# Patient Record
Sex: MALE | Race: WHITE | NOT HISPANIC OR LATINO
[De-identification: names, ages, dates, MRNs, and addresses within clinical notes are randomized per-mention and may not be internally consistent; named-entity substitution may affect disease eponyms.]

---

## 2023-01-09 PROBLEM — Z00.00 ENCOUNTER FOR PREVENTIVE HEALTH EXAMINATION: Status: ACTIVE | Noted: 2023-01-09

## 2023-02-04 ENCOUNTER — NON-APPOINTMENT (OUTPATIENT)
Age: 43
End: 2023-02-04

## 2023-02-07 ENCOUNTER — APPOINTMENT (OUTPATIENT)
Dept: CARDIOLOGY | Facility: CLINIC | Age: 43
End: 2023-02-07
Payer: COMMERCIAL

## 2023-02-07 ENCOUNTER — NON-APPOINTMENT (OUTPATIENT)
Age: 43
End: 2023-02-07

## 2023-02-07 VITALS
WEIGHT: 178 LBS | DIASTOLIC BLOOD PRESSURE: 78 MMHG | OXYGEN SATURATION: 98 % | HEART RATE: 78 BPM | SYSTOLIC BLOOD PRESSURE: 130 MMHG | HEIGHT: 69 IN | BODY MASS INDEX: 26.36 KG/M2

## 2023-02-07 VITALS — DIASTOLIC BLOOD PRESSURE: 80 MMHG | SYSTOLIC BLOOD PRESSURE: 144 MMHG

## 2023-02-07 DIAGNOSIS — Z82.49 FAMILY HISTORY OF ISCHEMIC HEART DISEASE AND OTHER DISEASES OF THE CIRCULATORY SYSTEM: ICD-10-CM

## 2023-02-07 PROCEDURE — 93000 ELECTROCARDIOGRAM COMPLETE: CPT

## 2023-02-07 PROCEDURE — 99204 OFFICE O/P NEW MOD 45 MIN: CPT | Mod: 25

## 2023-02-07 PROCEDURE — 99406 BEHAV CHNG SMOKING 3-10 MIN: CPT

## 2023-02-07 RX ORDER — ATORVASTATIN CALCIUM 20 MG/1
20 TABLET, FILM COATED ORAL
Qty: 90 | Refills: 1 | Status: ACTIVE | COMMUNITY
Start: 2023-02-07

## 2023-02-07 NOTE — COUNSELING
[Yes] : Risk of tobacco use and health benefits of smoking cessation discussed: Yes [Cessation strategies including cessation program discussed] : Cessation strategies including cessation program discussed [Use of nicotine replacement therapies and other medications discussed] : Use of nicotine replacement therapies and other medications discussed [Encouraged to pick a quit date and identify support needed to quit] : Encouraged to pick a quit date and identify support needed to quit [Smoking Cessation Program Referral] : Smoking Cessation Program Referral  [No] : Not willing to quit smoking [FreeTextEntry1] : 3

## 2023-02-07 NOTE — PHYSICAL EXAM
[Well Developed] : well developed [Well Nourished] : well nourished [No Acute Distress] : no acute distress [Normal Venous Pressure] : normal venous pressure [No Carotid Bruit] : no carotid bruit [Normal S1, S2] : normal S1, S2 [No Murmur] : no murmur [No Rub] : no rub [No Gallop] : no gallop [Clear Lung Fields] : clear lung fields [Good Air Entry] : good air entry [No Respiratory Distress] : no respiratory distress  [Soft] : abdomen soft [Normal Gait] : normal gait [No Edema] : no edema [No Cyanosis] : no cyanosis [No Clubbing] : no clubbing [Normal Radial B/L] : normal radial B/L [Normal DP B/L] : normal DP B/L [Moves all extremities] : moves all extremities [Normal Speech] : normal speech [Alert and Oriented] : alert and oriented [Normal memory] : normal memory

## 2023-02-07 NOTE — ASSESSMENT
[FreeTextEntry1] : Reviewed on February 7, 2023.\par EKG today reviewed\par EKG from outside reviewed.\par Labs which were done from your office reviewed.   HDL 35 triglycerides 113 total cholesterol 217 sodium 140 potassium 4.8 hemoglobin A1c 5.1 creatinine 0.92

## 2023-02-07 NOTE — DISCUSSION/SUMMARY
[FreeTextEntry1] : 42-year-old gentleman with above medical history active medical problems as noted below\par 1.  Chest pain.  Atypical.  Nonexertional.  No EKG changes.  CRP and ESR level ordered.\par Echocardiogram for LV ejection fraction wall motion pericardial space.\par Exercise treadmill stress test to assess evaluate and rule out any significant exercise induced obstructive disease.\par If no other etiology noted consider cardiac CT scan for further evaluation management which may include coronary calcium score and examination of lungs.\par If any worsening to call 911 or go to nearest emergency room\par #2 dyslipidemia.  Based on above evaluation will discuss further regarding statin therapy or not.  Lifestyle modifications reviewed\par 3.  Cigarette smoking.  Insert smoking

## 2023-02-07 NOTE — REASON FOR VISIT
[Symptom and Test Evaluation] : symptom and test evaluation [Hyperlipidemia] : hyperlipidemia [Hypertension] : hypertension [Spouse] : spouse [FreeTextEntry3] : Dr. William Payne [FreeTextEntry1] : 42-year-old gentleman is referred to me for consultation with complaint of\par Left-sided precordial localized chest pain at rest.  Since September.  Intermittent.  Nonexertional.  No other associated symptoms.  \par She had COVID-19 infection in December.  Since then his symptoms have improved.\par He is a smoker.\par He has had borderline blood pressure though home readings have been stable and less than 130/80 and majority of time\par He does have dyslipidemia.  Which is managed with dietary restrictions.\par He does have family history.  Of cardiovascular disease.\par He denies any history of hypertension, diabetes mellitus, myocardial infarction, coronary artery disease, cerebrovascular accident, peripheral artery disease, rheumatic fever, thyroid or Lyme disease.

## 2023-03-06 ENCOUNTER — APPOINTMENT (OUTPATIENT)
Dept: CARDIOLOGY | Facility: CLINIC | Age: 43
End: 2023-03-06

## 2023-03-13 ENCOUNTER — APPOINTMENT (OUTPATIENT)
Dept: CARDIOLOGY | Facility: CLINIC | Age: 43
End: 2023-03-13
Payer: COMMERCIAL

## 2023-03-13 PROCEDURE — 93306 TTE W/DOPPLER COMPLETE: CPT

## 2023-03-21 ENCOUNTER — APPOINTMENT (OUTPATIENT)
Dept: CARDIOLOGY | Facility: CLINIC | Age: 43
End: 2023-03-21

## 2023-03-23 ENCOUNTER — APPOINTMENT (OUTPATIENT)
Dept: CARDIOLOGY | Facility: CLINIC | Age: 43
End: 2023-03-23
Payer: COMMERCIAL

## 2023-03-23 PROCEDURE — 93351 STRESS TTE COMPLETE: CPT

## 2023-03-23 PROCEDURE — 93320 DOPPLER ECHO COMPLETE: CPT

## 2023-03-29 ENCOUNTER — NON-APPOINTMENT (OUTPATIENT)
Age: 43
End: 2023-03-29

## 2023-03-29 ENCOUNTER — APPOINTMENT (OUTPATIENT)
Dept: CARDIOLOGY | Facility: CLINIC | Age: 43
End: 2023-03-29
Payer: COMMERCIAL

## 2023-03-29 VITALS
RESPIRATION RATE: 12 BRPM | WEIGHT: 175 LBS | OXYGEN SATURATION: 98 % | HEIGHT: 69 IN | TEMPERATURE: 97.6 F | BODY MASS INDEX: 25.92 KG/M2 | HEART RATE: 68 BPM | SYSTOLIC BLOOD PRESSURE: 122 MMHG | DIASTOLIC BLOOD PRESSURE: 76 MMHG

## 2023-03-29 PROCEDURE — 99214 OFFICE O/P EST MOD 30 MIN: CPT

## 2023-03-29 RX ORDER — ASPIRIN ENTERIC COATED TABLETS 81 MG 81 MG/1
81 TABLET, DELAYED RELEASE ORAL DAILY
Qty: 30 | Refills: 3 | Status: ACTIVE | COMMUNITY
Start: 2023-03-29 | End: 1900-01-01

## 2023-03-29 NOTE — DISCUSSION/SUMMARY
[FreeTextEntry1] : GALEN KRUGER is a 43 year old M who presents today Mar 29, 2023 with the above history and the following active issues:\par \par Due to ongoing sx's of chest pain and significant PMH, as well as equivocal EKG findings on stress test further evaluation is warranted.\par Proceed with CT Cors to r/o CAD. Denies allergies to IV dye/contrast. Start ASA.\par Will apply Zio for palpitations.\par Will do abd u/s to r/o aneurysm, note family hx.\par \par Incidental findings on liver. seen on echo 3/2023. Echo report printed for patient and he will f/u with PCP.\par \par BP well controlled today.\par \par HLD: Continue statin. Has upcoming labs with PCP.\par \par Smoking cessation advised.\par \par Ongoing f/u with PCP.\par \par \par F/U after testing to review results (CT, Zio, Labs, and abd u/s).\par Discussed red flag symptoms, which would warrant sooner or emergent medical evaluation.\par Any questions and concerns were addressed and resolved.\par \par Sincerely,\par Elyse Pearl FN-BC\par Patient's history, testing, and plan was reviewed with supervising physician, Dr. Rashaad Guerrero\par

## 2023-03-29 NOTE — HISTORY OF PRESENT ILLNESS
[FreeTextEntry1] : GALEN KRUGER is a 43 year old male with a past medical history of HTn, HLD, family hx of cardiovascular dz.\par \par Last seen 23. Patient c/o CP. CRP and ESR level ordered as well as echo, ETT. In the interim there have been no hospitalizations or procedures. Still reports chest pain lasting up to 1 hour. Nonradiating. Nonexertional. There are palpitations. He denies unusual shortness of breath, orthopnea, LE edema, lightheadedness, dizziness, near syncope or syncope. Active smoker. Not on a formal exercise regimen. Works as a .\par \par Family hx of cardiac disease and please note family hx, grandfather  at age 34 of aortic aneurysm.\par \par Testing:\par \par Stress echo 3/23/23: \par CONCLUSIONS:\par 1. The patient underwent stress testing using the Standard Dimitris protocol.\par • The patient exercised for 11 min 30 sec. Test was stopped due to target heart rate achieved, patient\par request and maximum exertion effort.\par • The peak heart rate was 168 bpm: 94 % of predicted maximal heart rate for this patient.\par • The pateint achieved 12.1 METs which is consistent with good exercise capacity.\par 2. Resting baseline LV ejection fraction was was estimated at approximately 60% (normal EF).\par 3. Immediate post-stress there were no regional wall motion abnormalities seen.\par 4. Additional Ischemic Changes: 0.5- 1mm upsloping ST depressions inferolaterally.\par 5. EKG changes eqivocal for ischemia.\par 6. Despite EKG changes, no echocardiographic evidence of ischemia.\par 7. The Duke Treadmill Score is 6.00, which is consistent with low risk (=5) for future cardiac events.\par \par Echo 3/13/23: CONCLUSIONS:\par 1. The left ventricular systolic function is normal with an ejection fraction visually estimated at 55 to 60\par %.\par 2. Normal right ventricular size, with normal wall thickness and normal systolic function.\par 3. No significant valvular disease.\par 4. No echocardiographic evidence of pulmonary hypertension.\par 5. No pericardial effusion seen.\par 6. Echo-free space is noted in the liver consistent with cyst, however, dedicated imaging recommended\par for further evaluation if clinically indicated.\par 7. No prior echo is available for comparison.\par \par Labs 23: ESR 5, CRP 0.05\par \par Labs 22: Chol 217, HDL 35, Trigs 113, , Cr 0.92, Na 140, K 4.8, Ca 9.6, AST 18, ALT 14, A1C 5.1, TSH 2.03, WBC 9.8, Hgb 16.4, HCT 46.1, plt 246CRP 0.8

## 2023-04-11 ENCOUNTER — NON-APPOINTMENT (OUTPATIENT)
Age: 43
End: 2023-04-11

## 2023-04-18 ENCOUNTER — APPOINTMENT (OUTPATIENT)
Dept: CARDIOLOGY | Facility: CLINIC | Age: 43
End: 2023-04-18

## 2023-04-27 PROBLEM — E78.00 HIGH CHOLESTEROL: Status: ACTIVE | Noted: 2023-02-07

## 2023-04-27 PROBLEM — Z13.6 ENCOUNTER FOR SCREENING FOR CARDIOVASCULAR DISORDERS: Status: ACTIVE | Noted: 2023-02-07

## 2023-04-27 PROBLEM — F17.210 CIGARETTE SMOKER: Status: ACTIVE | Noted: 2023-02-07

## 2023-04-27 PROBLEM — G47.33 OBSTRUCTIVE SLEEP APNEA, ADULT: Status: ACTIVE | Noted: 2023-02-07

## 2023-04-27 PROBLEM — G47.33 OSA ON CPAP: Status: ACTIVE | Noted: 2023-03-29

## 2023-04-27 PROBLEM — R07.89 CHEST TIGHTNESS: Status: ACTIVE | Noted: 2023-02-07

## 2023-05-04 ENCOUNTER — APPOINTMENT (OUTPATIENT)
Dept: CARDIOLOGY | Facility: CLINIC | Age: 43
End: 2023-05-04
Payer: COMMERCIAL

## 2023-05-04 VITALS
DIASTOLIC BLOOD PRESSURE: 72 MMHG | WEIGHT: 175 LBS | HEART RATE: 80 BPM | BODY MASS INDEX: 25.92 KG/M2 | OXYGEN SATURATION: 95 % | HEIGHT: 69 IN | SYSTOLIC BLOOD PRESSURE: 130 MMHG

## 2023-05-04 DIAGNOSIS — Z13.6 ENCOUNTER FOR SCREENING FOR CARDIOVASCULAR DISORDERS: ICD-10-CM

## 2023-05-04 DIAGNOSIS — R07.89 OTHER CHEST PAIN: ICD-10-CM

## 2023-05-04 DIAGNOSIS — Q24.5 MALFORMATION OF CORONARY VESSELS: ICD-10-CM

## 2023-05-04 DIAGNOSIS — F17.210 NICOTINE DEPENDENCE, CIGARETTES, UNCOMPLICATED: ICD-10-CM

## 2023-05-04 DIAGNOSIS — G47.33 OBSTRUCTIVE SLEEP APNEA (ADULT) (PEDIATRIC): ICD-10-CM

## 2023-05-04 DIAGNOSIS — Z99.89 OBSTRUCTIVE SLEEP APNEA (ADULT) (PEDIATRIC): ICD-10-CM

## 2023-05-04 DIAGNOSIS — E78.00 PURE HYPERCHOLESTEROLEMIA, UNSPECIFIED: ICD-10-CM

## 2023-05-04 PROCEDURE — 99215 OFFICE O/P EST HI 40 MIN: CPT

## 2023-05-04 RX ORDER — NAPROXEN 250 MG/1
250 TABLET ORAL
Refills: 0 | Status: ACTIVE | COMMUNITY

## 2023-05-04 NOTE — HISTORY OF PRESENT ILLNESS
[FreeTextEntry1] : Darrell is a 43-year-old male with history of hyperlipidemia on atorvastatin, borderline HTN, tobacco addiction, family history of premature CAD.\par \par No history of CAD, MI, revascularization, VHD, CHF, TIA, CVA, diabetes, PVD, DVT, PE, arrhythmia, AF.\par \par Chest pain has resolved after starting Naprosyn.  Cardiovascular review of symptoms is negative for exertional chest pain, dyspnea, palpitations, dizziness or syncope.  No PND or orthopnea leg edema.  No bleeding or black stool.\par \par No exercise routine.  Patient is walking 30 minutes without exertional symptoms.  Patient states he is physically active as a  walking more than 14,000 steps per day. 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

## 2023-05-04 NOTE — DISCUSSION/SUMMARY
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  Normal exercise stress echo in the past.  Exercise Myoview stress test ordered to further assess for ischemia.\par \par - Hyperlipidemia on atorvastatin well-tolerated\par \par - Tobacco addiction.  Smoking cessation has been discussed at length, patient will make an effort to reduce smoking and quit.\par \par Cardiology follow-up after stress test. \par \par Darrell will follow up with Dr. William Payne for primary care